# Patient Record
Sex: FEMALE | Race: BLACK OR AFRICAN AMERICAN | Employment: FULL TIME | URBAN - METROPOLITAN AREA
[De-identification: names, ages, dates, MRNs, and addresses within clinical notes are randomized per-mention and may not be internally consistent; named-entity substitution may affect disease eponyms.]

---

## 2019-01-01 LAB
ANTIBODY SCREEN, EXTERNAL: NEGATIVE
CHLAMYDIA, EXTERNAL: NEGATIVE
HBSAG, EXTERNAL: NEGATIVE
N. GONORRHEA, EXTERNAL: NEGATIVE
RPR, EXTERNAL: NON REACTIVE
RUBELLA, EXTERNAL: NORMAL
TYPE, ABO & RH, EXTERNAL: NORMAL

## 2019-05-24 LAB — GRBS, EXTERNAL: NEGATIVE

## 2019-06-16 ENCOUNTER — HOSPITAL ENCOUNTER (INPATIENT)
Age: 24
LOS: 2 days | Discharge: HOME | End: 2019-06-18
Attending: OBSTETRICS & GYNECOLOGY | Admitting: OBSTETRICS & GYNECOLOGY
Payer: COMMERCIAL

## 2019-06-16 ENCOUNTER — ANESTHESIA EVENT (OUTPATIENT)
Dept: LABOR AND DELIVERY | Age: 24
End: 2019-06-16
Payer: COMMERCIAL

## 2019-06-16 ENCOUNTER — ANESTHESIA (OUTPATIENT)
Dept: LABOR AND DELIVERY | Age: 24
End: 2019-06-16
Payer: COMMERCIAL

## 2019-06-16 PROBLEM — Z33.1 IUP (INTRAUTERINE PREGNANCY), INCIDENTAL: Status: ACTIVE | Noted: 2019-06-16

## 2019-06-16 PROBLEM — Z34.90 PREGNANCY: Status: ACTIVE | Noted: 2019-06-16

## 2019-06-16 LAB
ABO + RH BLD: NORMAL
BASOPHILS # BLD: 0 K/UL (ref 0–0.1)
BASOPHILS NFR BLD: 0 % (ref 0–2)
BLOOD GROUP ANTIBODIES SERPL: NORMAL
DIFFERENTIAL METHOD BLD: ABNORMAL
EOSINOPHIL # BLD: 0.1 K/UL (ref 0–0.4)
EOSINOPHIL NFR BLD: 1 % (ref 0–5)
ERYTHROCYTE [DISTWIDTH] IN BLOOD BY AUTOMATED COUNT: 13.4 % (ref 11.6–14.5)
HCT VFR BLD AUTO: 35.8 % (ref 35–45)
HGB BLD-MCNC: 12.4 G/DL (ref 12–16)
LYMPHOCYTES # BLD: 1.7 K/UL (ref 0.9–3.6)
LYMPHOCYTES NFR BLD: 22 % (ref 21–52)
MCH RBC QN AUTO: 31.2 PG (ref 24–34)
MCHC RBC AUTO-ENTMCNC: 34.6 G/DL (ref 31–37)
MCV RBC AUTO: 89.9 FL (ref 74–97)
MONOCYTES # BLD: 0.9 K/UL (ref 0.05–1.2)
MONOCYTES NFR BLD: 12 % (ref 3–10)
NEUTS SEG # BLD: 5.2 K/UL (ref 1.8–8)
NEUTS SEG NFR BLD: 65 % (ref 40–73)
PLATELET # BLD AUTO: 206 K/UL (ref 135–420)
PMV BLD AUTO: 11.9 FL (ref 9.2–11.8)
RBC # BLD AUTO: 3.98 M/UL (ref 4.2–5.3)
SPECIMEN EXP DATE BLD: NORMAL
WBC # BLD AUTO: 7.9 K/UL (ref 4.6–13.2)

## 2019-06-16 PROCEDURE — 76060000078 HC EPIDURAL ANESTHESIA

## 2019-06-16 PROCEDURE — 74011250636 HC RX REV CODE- 250/636: Performed by: OBSTETRICS & GYNECOLOGY

## 2019-06-16 PROCEDURE — 74011250637 HC RX REV CODE- 250/637: Performed by: OBSTETRICS & GYNECOLOGY

## 2019-06-16 PROCEDURE — 85025 COMPLETE CBC W/AUTO DIFF WBC: CPT

## 2019-06-16 PROCEDURE — 75410000003 HC RECOV DEL/VAG/CSECN EA 0.5 HR

## 2019-06-16 PROCEDURE — 75410000002 HC LABOR FEE PER 1 HR

## 2019-06-16 PROCEDURE — 99282 EMERGENCY DEPT VISIT SF MDM: CPT

## 2019-06-16 PROCEDURE — 86900 BLOOD TYPING SEROLOGIC ABO: CPT

## 2019-06-16 PROCEDURE — 74011250637 HC RX REV CODE- 250/637

## 2019-06-16 PROCEDURE — 0KQM0ZZ REPAIR PERINEUM MUSCLE, OPEN APPROACH: ICD-10-PCS | Performed by: OBSTETRICS & GYNECOLOGY

## 2019-06-16 PROCEDURE — 74011250636 HC RX REV CODE- 250/636

## 2019-06-16 PROCEDURE — 74011000250 HC RX REV CODE- 250: Performed by: SPECIALIST

## 2019-06-16 PROCEDURE — 65270000029 HC RM PRIVATE

## 2019-06-16 PROCEDURE — 74011000250 HC RX REV CODE- 250

## 2019-06-16 PROCEDURE — 77030034849

## 2019-06-16 PROCEDURE — 75410000000 HC DELIVERY VAGINAL/SINGLE

## 2019-06-16 RX ORDER — HYDROMORPHONE HYDROCHLORIDE 1 MG/ML
1 INJECTION, SOLUTION INTRAMUSCULAR; INTRAVENOUS; SUBCUTANEOUS
Status: DISCONTINUED | OUTPATIENT
Start: 2019-06-16 | End: 2019-06-16 | Stop reason: HOSPADM

## 2019-06-16 RX ORDER — AMOXICILLIN 250 MG
1 CAPSULE ORAL
Status: DISCONTINUED | OUTPATIENT
Start: 2019-06-16 | End: 2019-06-18 | Stop reason: HOSPADM

## 2019-06-16 RX ORDER — OXYTOCIN/RINGER'S LACTATE 20/1000 ML
125 PLASTIC BAG, INJECTION (ML) INTRAVENOUS CONTINUOUS
Status: DISCONTINUED | OUTPATIENT
Start: 2019-06-16 | End: 2019-06-16 | Stop reason: HOSPADM

## 2019-06-16 RX ORDER — MINERAL OIL
OIL (ML) ORAL
Status: COMPLETED
Start: 2019-06-16 | End: 2019-06-16

## 2019-06-16 RX ORDER — FENTANYL/ROPIVACAINE/NS/PF 2MCG/ML-.1
1-15 PLASTIC BAG, INJECTION (ML) EPIDURAL
Status: DISCONTINUED | OUTPATIENT
Start: 2019-06-16 | End: 2019-06-16 | Stop reason: HOSPADM

## 2019-06-16 RX ORDER — SODIUM CHLORIDE 0.9 % (FLUSH) 0.9 %
5-40 SYRINGE (ML) INJECTION EVERY 8 HOURS
Status: DISCONTINUED | OUTPATIENT
Start: 2019-06-16 | End: 2019-06-16 | Stop reason: HOSPADM

## 2019-06-16 RX ORDER — FENTANYL CITRATE 50 UG/ML
INJECTION, SOLUTION INTRAMUSCULAR; INTRAVENOUS AS NEEDED
Status: DISCONTINUED | OUTPATIENT
Start: 2019-06-16 | End: 2019-06-17 | Stop reason: HOSPADM

## 2019-06-16 RX ORDER — TERBUTALINE SULFATE 1 MG/ML
0.25 INJECTION SUBCUTANEOUS
Status: DISCONTINUED | OUTPATIENT
Start: 2019-06-16 | End: 2019-06-16 | Stop reason: HOSPADM

## 2019-06-16 RX ORDER — PROMETHAZINE HYDROCHLORIDE 25 MG/ML
25 INJECTION, SOLUTION INTRAMUSCULAR; INTRAVENOUS
Status: DISCONTINUED | OUTPATIENT
Start: 2019-06-16 | End: 2019-06-18 | Stop reason: HOSPADM

## 2019-06-16 RX ORDER — LIDOCAINE HYDROCHLORIDE AND EPINEPHRINE 15; 5 MG/ML; UG/ML
INJECTION, SOLUTION EPIDURAL
Status: COMPLETED | OUTPATIENT
Start: 2019-06-16 | End: 2019-06-16

## 2019-06-16 RX ORDER — LIDOCAINE HYDROCHLORIDE 10 MG/ML
20 INJECTION, SOLUTION EPIDURAL; INFILTRATION; INTRACAUDAL; PERINEURAL AS NEEDED
Status: DISCONTINUED | OUTPATIENT
Start: 2019-06-16 | End: 2019-06-16 | Stop reason: HOSPADM

## 2019-06-16 RX ORDER — BUTORPHANOL TARTRATE 2 MG/ML
2 INJECTION INTRAMUSCULAR; INTRAVENOUS
Status: DISCONTINUED | OUTPATIENT
Start: 2019-06-16 | End: 2019-06-16 | Stop reason: HOSPADM

## 2019-06-16 RX ORDER — OXYCODONE AND ACETAMINOPHEN 5; 325 MG/1; MG/1
2 TABLET ORAL
Status: DISCONTINUED | OUTPATIENT
Start: 2019-06-16 | End: 2019-06-18 | Stop reason: HOSPADM

## 2019-06-16 RX ORDER — LIDOCAINE HYDROCHLORIDE 10 MG/ML
INJECTION INFILTRATION; PERINEURAL
Status: COMPLETED
Start: 2019-06-16 | End: 2019-06-16

## 2019-06-16 RX ORDER — ZOLPIDEM TARTRATE 5 MG/1
5 TABLET ORAL
Status: DISCONTINUED | OUTPATIENT
Start: 2019-06-16 | End: 2019-06-18 | Stop reason: HOSPADM

## 2019-06-16 RX ORDER — METHYLERGONOVINE MALEATE 0.2 MG/ML
0.2 INJECTION INTRAVENOUS AS NEEDED
Status: DISCONTINUED | OUTPATIENT
Start: 2019-06-16 | End: 2019-06-16 | Stop reason: HOSPADM

## 2019-06-16 RX ORDER — NALBUPHINE HYDROCHLORIDE 10 MG/ML
10 INJECTION, SOLUTION INTRAMUSCULAR; INTRAVENOUS; SUBCUTANEOUS
Status: DISCONTINUED | OUTPATIENT
Start: 2019-06-16 | End: 2019-06-16 | Stop reason: HOSPADM

## 2019-06-16 RX ORDER — IBUPROFEN 400 MG/1
800 TABLET ORAL
Status: DISCONTINUED | OUTPATIENT
Start: 2019-06-16 | End: 2019-06-18 | Stop reason: HOSPADM

## 2019-06-16 RX ORDER — BUTORPHANOL TARTRATE 2 MG/ML
INJECTION INTRAMUSCULAR; INTRAVENOUS
Status: DISPENSED
Start: 2019-06-16 | End: 2019-06-16

## 2019-06-16 RX ORDER — FENTANYL CITRATE 50 UG/ML
INJECTION, SOLUTION INTRAMUSCULAR; INTRAVENOUS
Status: COMPLETED
Start: 2019-06-16 | End: 2019-06-16

## 2019-06-16 RX ORDER — FENTANYL CITRATE 50 UG/ML
100 INJECTION, SOLUTION INTRAMUSCULAR; INTRAVENOUS ONCE
Status: ACTIVE | OUTPATIENT
Start: 2019-06-16 | End: 2019-06-16

## 2019-06-16 RX ORDER — OXYTOCIN/RINGER'S LACTATE 20/1000 ML
999 PLASTIC BAG, INJECTION (ML) INTRAVENOUS ONCE
Status: DISPENSED | OUTPATIENT
Start: 2019-06-16 | End: 2019-06-16

## 2019-06-16 RX ORDER — FENTANYL/ROPIVACAINE/NS/PF 2MCG/ML-.1
PLASTIC BAG, INJECTION (ML) EPIDURAL
Status: COMPLETED
Start: 2019-06-16 | End: 2019-06-16

## 2019-06-16 RX ORDER — SODIUM CHLORIDE 0.9 % (FLUSH) 0.9 %
5-40 SYRINGE (ML) INJECTION AS NEEDED
Status: DISCONTINUED | OUTPATIENT
Start: 2019-06-16 | End: 2019-06-16 | Stop reason: HOSPADM

## 2019-06-16 RX ORDER — SODIUM CHLORIDE, SODIUM LACTATE, POTASSIUM CHLORIDE, CALCIUM CHLORIDE 600; 310; 30; 20 MG/100ML; MG/100ML; MG/100ML; MG/100ML
125 INJECTION, SOLUTION INTRAVENOUS CONTINUOUS
Status: DISCONTINUED | OUTPATIENT
Start: 2019-06-16 | End: 2019-06-16 | Stop reason: HOSPADM

## 2019-06-16 RX ORDER — ACETAMINOPHEN 325 MG/1
650 TABLET ORAL
Status: DISCONTINUED | OUTPATIENT
Start: 2019-06-16 | End: 2019-06-18 | Stop reason: HOSPADM

## 2019-06-16 RX ORDER — MINERAL OIL
30 OIL (ML) ORAL AS NEEDED
Status: COMPLETED | OUTPATIENT
Start: 2019-06-16 | End: 2019-06-16

## 2019-06-16 RX ADMIN — SODIUM CHLORIDE, SODIUM LACTATE, POTASSIUM CHLORIDE, AND CALCIUM CHLORIDE 125 ML/HR: 600; 310; 30; 20 INJECTION, SOLUTION INTRAVENOUS at 01:09

## 2019-06-16 RX ADMIN — BUTORPHANOL TARTRATE 2 MG: 2 INJECTION, SOLUTION INTRAMUSCULAR; INTRAVENOUS at 01:56

## 2019-06-16 RX ADMIN — LIDOCAINE HYDROCHLORIDE AND EPINEPHRINE 5 ML: 15; 5 INJECTION, SOLUTION EPIDURAL at 02:47

## 2019-06-16 RX ADMIN — ROPIVACAINE HYDROCHLORIDE 10 ML/HR: 10 INJECTION, SOLUTION EPIDURAL at 02:53

## 2019-06-16 RX ADMIN — FENTANYL CITRATE 100 MCG: 50 INJECTION, SOLUTION INTRAMUSCULAR; INTRAVENOUS at 02:50

## 2019-06-16 RX ADMIN — LIDOCAINE HYDROCHLORIDE: 10 INJECTION, SOLUTION INFILTRATION; PERINEURAL at 13:53

## 2019-06-16 RX ADMIN — ACETAMINOPHEN 650 MG: 325 TABLET ORAL at 20:08

## 2019-06-16 RX ADMIN — Medication 125 ML/HR: at 13:35

## 2019-06-16 RX ADMIN — MINERAL 30 ML: 999 OIL ORAL at 14:00

## 2019-06-16 RX ADMIN — IBUPROFEN 800 MG: 400 TABLET ORAL at 15:00

## 2019-06-16 RX ADMIN — Medication 30 ML: at 14:00

## 2019-06-16 RX ADMIN — ROPIVACAINE HYDROCHLORIDE 10 ML/HR: 10 INJECTION, SOLUTION EPIDURAL at 10:34

## 2019-06-16 NOTE — PROGRESS NOTES
0712 Bedside report received from CELIA Solo RN. Pt resting in bed with no c/o pain at this time. Pt is aware of poc to use peanut ball and agrees at this time. Pt placed on right lateral with peanut ball between legs. PO fluid provided per request. Nothing else needed at this time. 4681 Dr. Miesha Ni called unit for patient update. Pt 690/0 and srom  at this time. Orders received to recheck sve x 1 hour and call MD with update. Pt turned to right side due to decel after rupture. Pad changed gown changed. 0810 kang placed  9120 MD notified of 8100/0-+1. Orders to recheck x1 hour  1030 pt states pain 7/10 anesthesia(Dr. Gemini Suarez) at bedside for bolus. New bag hung. 1130 pushing begins  1315 Vu called to bedside  1324 viable female born  1 placenta delivered  1 Dr. Miesha Ni called to notify of patient's BP's, orders received to call for anything 160/110 BP's  1530 pt assisted to bed pan to attempt to void. Void unsuccessful at this time.  [address] with baby and BF. Pain meds given  1645 assisted pt to br with georgette steady. Pt was able to void and assisted back to bed w/o complications. Baby was taken to NICU for lab draw escorted by fob and baby nurse KORIN Ling RN  1704 baby returned from NICU by FOB in stable condition.  education given to parents and visitors at this time  [phone] pt ambulated to br for 2nd void w/o complications. Pt states no pain at this time. . Report given to NAMAN Ga RN pt in stable condition.

## 2019-06-16 NOTE — ANESTHESIA PREPROCEDURE EVALUATION
Relevant Problems   No relevant active problems       Anesthetic History   No history of anesthetic complications            Review of Systems / Medical History  Patient summary reviewed, nursing notes reviewed and pertinent labs reviewed    Pulmonary  Within defined limits                 Neuro/Psych   Within defined limits           Cardiovascular  Within defined limits                     GI/Hepatic/Renal  Within defined limits              Endo/Other  Within defined limits           Other Findings              Physical Exam    Airway  Mallampati: II  TM Distance: 4 - 6 cm  Neck ROM: normal range of motion   Mouth opening: Normal     Cardiovascular               Dental  No notable dental hx       Pulmonary                 Abdominal  GI exam deferred       Other Findings            Anesthetic Plan    ASA: 1  Anesthesia type: epidural            Anesthetic plan and risks discussed with: Patient and Family      R/B discussed including ha backache bleeding infection nerve damage paralysis.

## 2019-06-16 NOTE — H&P
Ostetrical History and Physical    Subjective:     Date of Admission: 2019    Patient is a 21 y.o.  female admitted with labor gbs neg. For Obstetric history, see prenantal.    No past medical history on file. No past surgical history on file. Prior to Admission medications    Not on File     No Known Allergies   Social History     Tobacco Use    Smoking status: Never Smoker   Substance Use Topics    Alcohol use: Not on file      No family history on file. Review of Systems    Objective:     Blood pressure 143/89, pulse 96, temperature 98.8 °F (37.1 °C), height 5' 1\" (1.549 m), weight 70.3 kg (155 lb). Temp (24hrs), Av.8 °F (37.1 °C), Min:98.8 °F (37.1 °C), Max:98.8 °F (37.1 °C)        No intake/output data recorded. No intake/output data recorded. @BSHSIPHYSEXAM    Pelvic: Cervix4, Effaced:> 50%Station:-1 per rn  Data Review:   No results found for this or any previous visit (from the past 24 hour(s)).   Monitor:  Reactivity:present Variability:present Baseline:within normal limits    Assessment:     Active Problems:    Pregnancy (2019)        Plan:     Prophylaxis:  Deep Vein Thrombosis Protocol Active:Yes    Check labs:    Check  Prenatal:    Disposition    Total time spent with patient:In-Patient    Signed By: Martha Huffman MD                         2019

## 2019-06-16 NOTE — ANESTHESIA PROCEDURE NOTES
Epidural Block    Start time: 6/16/2019 2:37 AM  End time: 6/16/2019 2:58 AM  Performed by: Ivis Grubbs MD  Authorized by:  Ivis Grubbs MD     Pre-Procedure  Indication: labor epidural    Preanesthetic Checklist: patient identified, risks and benefits discussed, anesthesia consent, site marked, patient being monitored, timeout performed and anesthesia consent    Timeout Time: 02:42        Epidural:   Patient position:  Seated  Prep region:  Lumbar  Prep: Chlorhexidine    Location:  L3-4    Needle and Epidural Catheter:   Needle Type:  Tuohy  Needle Gauge:  17 G  Injection Technique:  Loss of resistance using air and loss of resistance using saline  Attempts:  1  Catheter Size:  19 G  Catheter at Skin Depth (cm):  9  Depth in Epidural Space (cm):  4  Events: no blood with aspiration, no cerebrospinal fluid with aspiration, no paresthesia and negative aspiration test      Assessment:   Catheter Secured:  Tegaderm and tape  Insertion:  Uncomplicated  Patient tolerance:  Patient tolerated the procedure well with no immediate complications

## 2019-06-16 NOTE — PROGRESS NOTES
Delivery Note    Obstetrician: ruby    Assistant: none    Pre-Delivery Diagnosis: Term pregnancy    Post-Delivery Diagnosis: Female    Intrapartum Event: None    Procedure: Spontaneous vaginal delivery    Epidural: YES    Monitor:  Fetal Heart Tones - External and Uterine Contractions - External    Indications for instrumental delivery: none    Estimated Blood Loss:     Episiotomy: none    Laceration(s):  2nd degree    Laceration(s) repair: YES with 2-0 vicryl    Presentation: Cephalic    Fetal Description: painter    Fetal Position: Occiput Anterior    Birth Weight:     Birth Length:     Apgar - One Minute: 6    Apgar - Five Minutes: 9    Umbilical Cord: Nuchal Cord x  1 and 3 vessels present    Specimens: none           Complications:  none           Cord Blood Results:   Information for the patient's :  Idelle Riedel [[id number]]   No results found for: PCTABR, [address], PCTDIG, BILI, ABORH, ABORHEXT    Prenatal Labs:     Lab Results   Component Value Date/Time    ABO/Rh(D) O POSITIVE 2019 01:00 AM    HBsAg, External negative  2019    Rubella, External immune  2019    RPR, External non reactive  2019    Gonorrhea, External negative  2019    Chlamydia, External negative  2019    GrBStrep, External negative  2019        Attending Attestation: I was present and scrubbed for the entire procedure    Signed By:  Brii Foster MD     2019

## 2019-06-16 NOTE — PROGRESS NOTES
1915:  Assumed care of pt from Bernard Chisholm RN. Pt in room with family with no complaints at this time.   Pt anticipating robert

## 2019-06-17 LAB
HCT VFR BLD AUTO: 29.4 % (ref 35–45)
HGB BLD-MCNC: 10.2 G/DL (ref 12–16)

## 2019-06-17 PROCEDURE — 85018 HEMOGLOBIN: CPT

## 2019-06-17 PROCEDURE — 65270000029 HC RM PRIVATE

## 2019-06-17 PROCEDURE — 74011250637 HC RX REV CODE- 250/637: Performed by: OBSTETRICS & GYNECOLOGY

## 2019-06-17 PROCEDURE — 36415 COLL VENOUS BLD VENIPUNCTURE: CPT

## 2019-06-17 PROCEDURE — 85014 HEMATOCRIT: CPT

## 2019-06-17 RX ADMIN — IBUPROFEN 800 MG: 400 TABLET ORAL at 17:50

## 2019-06-17 RX ADMIN — IBUPROFEN 800 MG: 400 TABLET ORAL at 10:59

## 2019-06-17 RX ADMIN — IBUPROFEN 800 MG: 400 TABLET ORAL at 04:36

## 2019-06-17 NOTE — PROGRESS NOTES
Progress Note    Patient: María Soriano MRN: [id number]  SSN: [id number]    YOB: 1995  Age: 21 y.o. Sex: female      Subjective:     Postpartum Day: 1 Vaginal Delivery    The patient is without complaints. The patient is ambulating well. The patient  tolerating a normal diet. The baby is well. Objective:      Patient Vitals for the past 8 hrs:   BP Temp Pulse Resp SpO2   06/17/19 0740 120/71 98 °F (36.7 °C) 93 17 100 %     LABS: Recent Results (from the past 24 hour(s))   HEMOGLOBIN    Collection Time: 06/17/19  1:30 AM   Result Value Ref Range    HGB 10.2 (L) 12.0 - 16.0 g/dL   HEMATOCRIT    Collection Time: 06/17/19  1:30 AM   Result Value Ref Range    HCT 29.4 (L) 35.0 - 45.0 %       Lab Results   Component Value Date/Time    HGB 10.2 (L) 06/17/2019 01:30 AM     Lab Results   Component Value Date/Time    HCT 29.4 (L) 06/17/2019 01:30 AM      Lochia:  appropriate   Uterine Fundus:   firm, -1     Lab/Data Review: All lab results for the last 24 hours reviewed. Assessment:     Status post: Doing well postpartum vaginal delivery day 1. Plan:     Continue day 1 post-vaginal delivery orders. Postpartum care discussed including diet, ambulation, and actvitiy restrictions.      Signed By: She Pate CNM     June 17, 2019

## 2019-06-17 NOTE — PROGRESS NOTES
Problem: Patient Education: Go to Patient Education Activity  Goal: Patient/Family Education  Outcome: Progressing Towards Goal     Problem: Vaginal Delivery: Day of Deliver-Laboring  Goal: Off Pathway (Use only if patient is Off Pathway)  Outcome: Progressing Towards Goal  Goal: Activity/Safety  Outcome: Progressing Towards Goal  Goal: Consults, if ordered  Outcome: Progressing Towards Goal  Goal: Diagnostic Test/Procedures  Outcome: Progressing Towards Goal  Goal: Nutrition/Diet  Outcome: Progressing Towards Goal  Goal: Discharge Planning  Outcome: Progressing Towards Goal  Goal: Medications  Outcome: Progressing Towards Goal  Goal: Respiratory  Outcome: Progressing Towards Goal  Goal: Treatments/Interventions/Procedures  Outcome: Progressing Towards Goal  Goal: *Vital signs within defined limits  Outcome: Progressing Towards Goal  Goal: *Labs within defined limits  Outcome: Progressing Towards Goal  Goal: *Hemodynamically stable  Outcome: Progressing Towards Goal  Goal: *Optimal pain control at patient's stated goal  Outcome: Progressing Towards Goal     Problem: Vaginal Delivery: Day of Delivery-Post delivery  Goal: Off Pathway (Use only if patient is Off Pathway)  Outcome: Progressing Towards Goal  Goal: Activity/Safety  Outcome: Progressing Towards Goal  Goal: Consults, if ordered  Outcome: Progressing Towards Goal  Goal: Nutrition/Diet  Outcome: Progressing Towards Goal  Goal: Discharge Planning  Outcome: Progressing Towards Goal  Goal: Medications  Outcome: Progressing Towards Goal  Goal: Treatments/Interventions/Procedures  Outcome: Progressing Towards Goal  Goal: *Vital signs within defined limits  Outcome: Progressing Towards Goal  Goal: *Labs within defined limits  Outcome: Progressing Towards Goal  Goal: *Hemodynamically stable  Outcome: Progressing Towards Goal  Goal: *Optimal pain control at patient's stated goal  Outcome: Progressing Towards Goal  Goal: *Participates in infant care  Outcome: Progressing Towards Goal  Goal: *Demonstrates progressive activity  Outcome: Progressing Towards Goal  Goal: *Tolerating diet  Outcome: Progressing Towards Goal     Problem: Pain  Goal: *Control of Pain  Outcome: Progressing Towards Goal     Problem: Patient Education: Go to Patient Education Activity  Goal: Patient/Family Education  Outcome: Progressing Towards Goal

## 2019-06-17 NOTE — LACTATION NOTE
in room, will return. 1020 Infant placed skin to skin with mom after blood sugar taken and infant latched and nursing well at 1035 for 10 minutes. Burping techniques shown and infant started rooting so put back to breast and infant latched and nursing well. Mom educated on breastfeeding basics--hunger cues, feeding on demand, waking baby if baby sleeps too long between feeds, importance of skin to skin, positioning and latching, risk of pacifier use and supplemental feedings, and importance of rooming in--and use of log sheet. Mom also educated on benefits of breastfeeding for herself and baby. Mom verbalized understanding. No questions at this time.

## 2019-06-17 NOTE — ANESTHESIA POSTPROCEDURE EVALUATION
6/17/2019  1:50 PM    Laboring Epidural Follow-up Note     Referring physician: Atul Conner MD   Patient status post vaginal delivery with labor epidural    Visit Vitals  /71 (BP 1 Location: Right arm, BP Patient Position: At rest)   Pulse 93   Temp 36.7 °C (98 °F)   Resp 17   Ht 5' 1\" (1.549 m)   Wt 70.3 kg (155 lb)   SpO2 100%   Breastfeeding? Unknown   BMI 29.29 kg/m²       Epidural removed by L&D staff  No sedation, pruritis noted. Adequate analgesia.   No obvious anesthesia complications          Pipe Arevalo, RAMILA

## 2019-06-17 NOTE — ROUTINE PROCESS
TRANSFER - OUT REPORT: 
 
Verbal report given to DEANN Turcios RN(name) on Juan Jose Mason  being transferred to GABRIELLA menchaca rN(unit) for routine progression of care Report consisted of patients Situation, Background, Assessment and  
Recommendations(SBAR). Information from the following report(s) Recent Results was reviewed with the receiving nurse. Lines:  
Peripheral IV 06/16/19 Left;Posterior Forearm (Active) Site Assessment Clean, dry, & intact 6/16/2019  7:58 PM  
Phlebitis Assessment 0 6/16/2019  7:58 PM  
Infiltration Assessment 0 6/16/2019  7:58 PM  
Dressing Status Clean, dry, & intact 6/16/2019  7:58 PM  
  
 
Opportunity for questions and clarification was provided. Patient transported with: 
 Registered Nurse

## 2019-06-17 NOTE — PROGRESS NOTES
Bedside and Verbal shift change report given to PITO Garcia RN (oncoming nurse) by KYUNG Gonzalez and NAHUM Rhodes RN (offgoing nurse). Report included the following information SBAR, Kardex, Intake/Output, MAR and Recent Results. Mother BF with FOB at bedside. 2030- VSS, assessment completed. Temp slightly elevated but patient states she is hot, air in room turned cooler. Pt given lanolin cream for BF comfort per request and OJ with crackers given per request. No other needs at this time. Updated on POC for baby and the need for F/U appt prior to d/c.     0700- Bedside and Verbal shift change report given to Jr Perdomo RN and (oncoming nurse) by Adriana Li. Hilton Wallace RN (offgoing nurse). Report included the following information SBAR, Kardex, Intake/Output, MAR and Recent Results.

## 2019-06-17 NOTE — PROGRESS NOTES
1920 Verbal and bedside shift change report given to PITO Combs RN with Rachell Locke RN. Report included the following information SBAR, Kardex, Intake/Output, MAR and Recent Results.

## 2019-06-18 VITALS
DIASTOLIC BLOOD PRESSURE: 77 MMHG | HEART RATE: 95 BPM | OXYGEN SATURATION: 100 % | SYSTOLIC BLOOD PRESSURE: 136 MMHG | RESPIRATION RATE: 18 BRPM | HEIGHT: 61 IN | TEMPERATURE: 98.4 F | WEIGHT: 155 LBS | BODY MASS INDEX: 29.27 KG/M2

## 2019-06-18 PROCEDURE — 74011250637 HC RX REV CODE- 250/637: Performed by: OBSTETRICS & GYNECOLOGY

## 2019-06-18 RX ORDER — IBUPROFEN 800 MG/1
800 TABLET ORAL
Qty: 60 TAB | Refills: 1 | Status: SHIPPED | OUTPATIENT
Start: 2019-06-18

## 2019-06-18 RX ORDER — ACETAMINOPHEN AND CODEINE PHOSPHATE 120; 12 MG/5ML; MG/5ML
1 SOLUTION ORAL DAILY
Qty: 3 DOSE PACK | Refills: 3 | Status: SHIPPED | OUTPATIENT
Start: 2019-06-30

## 2019-06-18 RX ADMIN — IBUPROFEN 800 MG: 400 TABLET ORAL at 01:50

## 2019-06-18 RX ADMIN — ACETAMINOPHEN 650 MG: 325 TABLET ORAL at 07:57

## 2019-06-18 RX ADMIN — IBUPROFEN 800 MG: 400 TABLET ORAL at 13:49

## 2019-06-18 NOTE — LACTATION NOTE
Per mom, infant latching and nursing well. Breastfeeding discharge teaching completed to include feeding on demand, foremilk and hindmilk importance, engorgement, mastitis, clogged ducts, pumping, breastmilk storage, and returning to work. Information given about unit and office phone numbers and encouraged mom to reach out if concerns arise, but that [facility] would be calling her in the next few days to follow up on breastfeeding. Mom verbalized understanding and no questions at this time.

## 2019-06-18 NOTE — PROGRESS NOTES
Progress Note    Patient: Maria E Lai MRN: [id number]  SSN: [id number]    YOB: 1995  Age: 21 y.o. Sex: female      Subjective:     Postpartum Day: 2 Vaginal Delivery    The patient is without complaints. The patient is ambulating well. The patient  tolerating a normal diet. Flatus has been passed. The baby is well. Objective:      Patient Vitals for the past 8 hrs:   BP Temp Pulse Resp SpO2   19 0900 (!) 138/91 98.4 °F (36.9 °C) 95 18 100 %     LABS: No results found for this or any previous visit (from the past 24 hour(s)). Lochia:  appropriate   Uterine Fundus:   firm -1     Lab/Data Review: All lab results for the last 24 hours reviewed. Assessment:     Status post: Doing well postpartum vaginal delivery day 2. Plan:     Continue day 2 post-vaginal delivery orders, discharge today. Postpartum care discussed including diet, ambulation, and actvitiy restrictions. Patient is breastfeeding. Continue pre- vitamins. Medications as per MRF and discharge instructions. . Follow-up in six weeks.       Signed By: Maribell Munson CNM     2019

## 2019-06-18 NOTE — PROGRESS NOTES
Discharged home in stable condition with baby in arms. Instructed to call OB to make 2 weeks and 6 weeks follow up appointments. Verbalizes good understanding of discharge instructions. All questions answered and community resources pamphlet given.

## 2019-06-18 NOTE — DISCHARGE SUMMARY
Obstetrical Discharge Summary     Name: Brenda Thayer MRN: [id number]  SSN: [id number]    YOB: 1995  Age: 21 y.o. Sex: female      Admit Date: 2019    Discharge Date: 2019     Admitting Physician: Artis Hastings MD     Attending Physician:  Abdiel Mcbride MD     Admission Diagnoses: Pregnancy [Z34.90]  IUP (intrauterine pregnancy), incidental [Z34.90]    Discharge Diagnoses:   Information for the patient's :  Chandler Cordova [[id number]]   Delivery of a 2.985 kg female infant via Vaginal, Spontaneous on 2019 at 1:24 PM  by Yenifer Morin. Apgars were 6  and 9 . Additional Diagnoses:   Hospital Problems  Date Reviewed: 2019          Codes Class Noted POA    Pregnancy ICD-10-CM: Z34.90  ICD-9-CM: V22.2  2019 Unknown        IUP (intrauterine pregnancy), incidental ICD-10-CM: Z34.90  ICD-9-CM: V22.2  2019 Unknown             Lab Results   Component Value Date/Time    Rubella, External immune  2019    GrBStrep, External negative  2019       Immunization(s): There is no immunization history for the selected administration types on file for this patient. Hospital Course: Normal hospital course following the delivery. Patient Instructions:   Current Discharge Medication List      START taking these medications    Details   ibuprofen (MOTRIN) 800 mg tablet Take 1 Tab by mouth every eight (8) hours as needed for Pain. Qty: 60 Tab, Refills: 1      norethindrone (MICRONOR) 0.35 mg tab Take 1 Tab by mouth daily. Qty: 3 Dose Pack, Refills: 3             Reference my discharge instructions.     Follow-up Appointments   Procedures    FOLLOW UP VISIT Appointment in: 6 Weeks     Standing Status:   Standing     Number of Occurrences:   1     Order Specific Question:   Appointment in     Answer:   6 Weeks        Signed By:  Lety Escobar CNM     2019

## 2019-06-18 NOTE — DISCHARGE INSTRUCTIONS
POST DELIVERY DISCHARGE INSTRUCTIONS    Name: Luis M Le  YOB: 1995  Primary Diagnosis: Active Problems:    Pregnancy (2019)      IUP (intrauterine pregnancy), incidental (2019)        General:     Diet/Diet Restrictions:  Eight 8-ounce glasses of fluid daily (water, juices); avoid excessive caffeine intake. Meals/snacks as desired which are high in fiber and carbohydrates and low in fat and cholesterol. Physical Activity / Restrictions / Safety:     Avoid heavy lifting, no more that 8 lbs. For 2-3 weeks; limit use of stairs to 2 times daily for the first week home. No driving for one week. Avoid intercourse 4-6 weeks, no douching or tampon use. Check with obstetrician before starting or resuming an exercise program.       Discharge Instructions/Special Treatment/Home Care Needs:     Continue prenatal vitamins. Continue to use squirt bottle with warm water on your episiotomy after each bathroom use until bleeding stops. Call your doctor for the following:     Fever over 100.4 degrees by mouth. Vaginal bleeding heavier than a normal menstrual period or clot larger than a golf ball. Red streaks or increased swelling of legs, painful red streaks on your breast.  Painful urination, constipation and increased pain or swelling or discharge with your incision. If you feel extremely anxious or overwhelmed. If you have thoughts of harming yourself and/or your baby. Pain Management:     Pain Management:   Take Acetaminophen (Tylenol) or Ibuprofen (Advil, Motrin), as directed for pain. Use a warm Sitz bath 3 times daily to relieve episiotomy or hemorrhoidal discomfort. Heating pad to  incision as needed. For hemorrhoidal discomfort, use Tucks and Anusol cream as needed and directed. Follow-Up Care:      These are general instructions for a healthy lifestyle:    No smoking/ No tobacco products/ Avoid exposure to second hand smoke    Surgeon General's Warning: Quitting smoking now greatly reduces serious risk to your health. Obesity, smoking, and sedentary lifestyle greatly increases your risk for illness    A healthy diet, regular physical exercise & weight monitoring are important for maintaining a healthy lifestyle    Recognize signs and symptoms of STROKE:    F-face looks uneven    A-arms unable to move or move unevenly    S-speech slurred or non-existent    T-time-call 911 as soon as signs and symptoms begin-DO NOT go       Back to bed or wait to see if you get better-TIME IS BRAIN.

## 2023-09-08 NOTE — PROGRESS NOTES
0100 assumed care of patient. 0155 sve 5-6/80/-1  0200 patient request epidural, bolus infusing   0236 Dr. Lelia Srivastava at bedside explaining epidural procedure, side effects, risks, benefits, and positioning. Patient verbalizes understanding. Time-out:0242  Procedure DJDUX:5057  Catheter in, needle VKH:7618  Test dose: 0247  Fentanyl vial handed to MD at bedside. 0249  Loading AGIV:9120  5967 placed on bedpan patient able to void. 0420 SVE 5-6/90/-1 buldging bag  0640 SVE unchanged  0645 placed on bedpan. 8353 Bedside and Verbal shift change report given to SAKSHI Carlson RN (oncoming nurse) by Larissa Hayes RN (offgoing nurse). Report included the following information SBAR, Intake/Output, MAR and Recent Results. DISCHARGE